# Patient Record
Sex: MALE | Race: BLACK OR AFRICAN AMERICAN | NOT HISPANIC OR LATINO | Employment: OTHER | URBAN - METROPOLITAN AREA
[De-identification: names, ages, dates, MRNs, and addresses within clinical notes are randomized per-mention and may not be internally consistent; named-entity substitution may affect disease eponyms.]

---

## 2023-04-21 LAB
ANION GAP IN SER/PLAS: 10 MMOL/L (ref 10–20)
CALCIUM (MG/DL) IN SER/PLAS: 9.5 MG/DL (ref 8.6–10.3)
CARBON DIOXIDE, TOTAL (MMOL/L) IN SER/PLAS: 27 MMOL/L (ref 21–32)
CHLORIDE (MMOL/L) IN SER/PLAS: 108 MMOL/L (ref 98–107)
CREATININE (MG/DL) IN SER/PLAS: 2.1 MG/DL (ref 0.5–1.3)
GFR MALE: 33 ML/MIN/1.73M2
GLUCOSE (MG/DL) IN SER/PLAS: 224 MG/DL (ref 74–99)
POTASSIUM (MMOL/L) IN SER/PLAS: 4.2 MMOL/L (ref 3.5–5.3)
SODIUM (MMOL/L) IN SER/PLAS: 141 MMOL/L (ref 136–145)
UREA NITROGEN (MG/DL) IN SER/PLAS: 26 MG/DL (ref 6–23)

## 2023-07-21 LAB
ALANINE AMINOTRANSFERASE (SGPT) (U/L) IN SER/PLAS: 14 U/L (ref 10–52)
ALBUMIN (G/DL) IN SER/PLAS: 4 G/DL (ref 3.4–5)
ALBUMIN (MG/L) IN URINE: 19 MG/L
ALBUMIN/CREATININE (UG/MG) IN URINE: 10.9 UG/MG CRT (ref 0–30)
ALKALINE PHOSPHATASE (U/L) IN SER/PLAS: 52 U/L (ref 33–136)
ANION GAP IN SER/PLAS: 12 MMOL/L (ref 10–20)
APPEARANCE, URINE: CLEAR
ASPARTATE AMINOTRANSFERASE (SGOT) (U/L) IN SER/PLAS: 14 U/L (ref 9–39)
BILIRUBIN TOTAL (MG/DL) IN SER/PLAS: 0.6 MG/DL (ref 0–1.2)
BILIRUBIN, URINE: NEGATIVE
BLOOD, URINE: NEGATIVE
CALCIDIOL (25 OH VITAMIN D3) (NG/ML) IN SER/PLAS: 42 NG/ML
CALCIUM (MG/DL) IN SER/PLAS: 9.3 MG/DL (ref 8.6–10.3)
CARBON DIOXIDE, TOTAL (MMOL/L) IN SER/PLAS: 25 MMOL/L (ref 21–32)
CHLORIDE (MMOL/L) IN SER/PLAS: 107 MMOL/L (ref 98–107)
COLOR, URINE: YELLOW
CREATININE (MG/DL) IN SER/PLAS: 2.05 MG/DL (ref 0.5–1.3)
CREATININE (MG/DL) IN URINE: 174 MG/DL (ref 20–370)
CREATININE (MG/DL) IN URINE: 174 MG/DL (ref 20–370)
ERYTHROCYTE DISTRIBUTION WIDTH (RATIO) BY AUTOMATED COUNT: 13.7 % (ref 11.5–14.5)
ERYTHROCYTE MEAN CORPUSCULAR HEMOGLOBIN CONCENTRATION (G/DL) BY AUTOMATED: 32.1 G/DL (ref 32–36)
ERYTHROCYTE MEAN CORPUSCULAR VOLUME (FL) BY AUTOMATED COUNT: 86 FL (ref 80–100)
ERYTHROCYTES (10*6/UL) IN BLOOD BY AUTOMATED COUNT: 4.94 X10E12/L (ref 4.5–5.9)
GFR MALE: 34 ML/MIN/1.73M2
GLUCOSE (MG/DL) IN SER/PLAS: 163 MG/DL (ref 74–99)
GLUCOSE, URINE: NEGATIVE MG/DL
HEMATOCRIT (%) IN BLOOD BY AUTOMATED COUNT: 42.7 % (ref 41–52)
HEMOGLOBIN (G/DL) IN BLOOD: 13.7 G/DL (ref 13.5–17.5)
HYALINE CASTS, URINE: ABNORMAL /LPF
KETONES, URINE: NEGATIVE MG/DL
LEUKOCYTE ESTERASE, URINE: ABNORMAL
LEUKOCYTES (10*3/UL) IN BLOOD BY AUTOMATED COUNT: 5.9 X10E9/L (ref 4.4–11.3)
MAGNESIUM (MG/DL) IN SER/PLAS: 1.68 MG/DL (ref 1.6–2.4)
NITRITE, URINE: NEGATIVE
PARATHYRIN INTACT (PG/ML) IN SER/PLAS: 58.5 PG/ML (ref 18.5–88)
PH, URINE: 5 (ref 5–8)
PHOSPHATE (MG/DL) IN SER/PLAS: 3 MG/DL (ref 2.5–4.9)
PLATELETS (10*3/UL) IN BLOOD AUTOMATED COUNT: 226 X10E9/L (ref 150–450)
POTASSIUM (MMOL/L) IN SER/PLAS: 4.1 MMOL/L (ref 3.5–5.3)
PROTEIN (MG/DL) IN URINE: 34 MG/DL (ref 5–25)
PROTEIN TOTAL: 7.7 G/DL (ref 6.4–8.2)
PROTEIN, URINE: NEGATIVE MG/DL
PROTEIN/CREATININE (MG/MG) IN URINE: 0.2 MG/MG CREAT (ref 0–0.17)
RBC, URINE: ABNORMAL /HPF (ref 0–5)
SODIUM (MMOL/L) IN SER/PLAS: 140 MMOL/L (ref 136–145)
SPECIFIC GRAVITY, URINE: 1.02 (ref 1–1.03)
URATE (MG/DL) IN SER/PLAS: 6.2 MG/DL (ref 4–7.5)
UREA NITROGEN (MG/DL) IN SER/PLAS: 27 MG/DL (ref 6–23)
UROBILINOGEN, URINE: <2 MG/DL (ref 0–1.9)
WBC, URINE: 3 /HPF (ref 0–5)

## 2023-11-14 ENCOUNTER — LAB (OUTPATIENT)
Dept: LAB | Facility: LAB | Age: 68
End: 2023-11-14
Payer: MEDICARE

## 2023-11-14 DIAGNOSIS — N40.1 BENIGN PROSTATIC HYPERPLASIA WITH LOWER URINARY TRACT SYMPTOMS: ICD-10-CM

## 2023-11-14 DIAGNOSIS — N18.4 CHRONIC KIDNEY DISEASE, STAGE 4 (SEVERE) (MULTI): ICD-10-CM

## 2023-11-14 DIAGNOSIS — N13.8 OTHER OBSTRUCTIVE AND REFLUX UROPATHY: ICD-10-CM

## 2023-11-14 DIAGNOSIS — N18.9 CHRONIC KIDNEY DISEASE, UNSPECIFIED: Primary | ICD-10-CM

## 2023-11-14 LAB
ANION GAP SERPL CALC-SCNC: 12 MMOL/L (ref 10–20)
BUN SERPL-MCNC: 24 MG/DL (ref 6–23)
CALCIUM SERPL-MCNC: 9 MG/DL (ref 8.6–10.3)
CHLORIDE SERPL-SCNC: 107 MMOL/L (ref 98–107)
CO2 SERPL-SCNC: 27 MMOL/L (ref 21–32)
CREAT SERPL-MCNC: 1.96 MG/DL (ref 0.5–1.3)
GFR SERPL CREATININE-BSD FRML MDRD: 36 ML/MIN/1.73M*2
GLUCOSE SERPL-MCNC: 122 MG/DL (ref 74–99)
POTASSIUM SERPL-SCNC: 3.7 MMOL/L (ref 3.5–5.3)
PSA SERPL-MCNC: 1.86 NG/ML
SODIUM SERPL-SCNC: 142 MMOL/L (ref 136–145)

## 2023-11-14 PROCEDURE — 36415 COLL VENOUS BLD VENIPUNCTURE: CPT

## 2023-11-14 PROCEDURE — 80048 BASIC METABOLIC PNL TOTAL CA: CPT

## 2023-11-14 PROCEDURE — 84153 ASSAY OF PSA TOTAL: CPT

## 2023-11-16 ENCOUNTER — OFFICE VISIT (OUTPATIENT)
Dept: UROLOGY | Facility: CLINIC | Age: 68
End: 2023-11-16
Payer: MEDICARE

## 2023-11-16 VITALS — RESPIRATION RATE: 16 BRPM | BODY MASS INDEX: 22.74 KG/M2 | WEIGHT: 154 LBS

## 2023-11-16 DIAGNOSIS — R35.1 BENIGN PROSTATIC HYPERPLASIA WITH NOCTURIA: ICD-10-CM

## 2023-11-16 DIAGNOSIS — N52.9 ERECTILE DISORDER: ICD-10-CM

## 2023-11-16 DIAGNOSIS — N40.1 BPH ASSOCIATED WITH NOCTURIA: ICD-10-CM

## 2023-11-16 DIAGNOSIS — N28.9 RENAL DISEASE: Primary | ICD-10-CM

## 2023-11-16 DIAGNOSIS — R35.1 NOCTURIA: ICD-10-CM

## 2023-11-16 DIAGNOSIS — N40.1 BENIGN PROSTATIC HYPERPLASIA WITH NOCTURIA: ICD-10-CM

## 2023-11-16 DIAGNOSIS — R35.1 BPH ASSOCIATED WITH NOCTURIA: ICD-10-CM

## 2023-11-16 PROBLEM — N40.0 BPH (BENIGN PROSTATIC HYPERPLASIA): Status: ACTIVE | Noted: 2023-11-16

## 2023-11-16 PROCEDURE — 99214 OFFICE O/P EST MOD 30 MIN: CPT | Performed by: UROLOGY

## 2023-11-16 PROCEDURE — 1159F MED LIST DOCD IN RCRD: CPT | Performed by: UROLOGY

## 2023-11-16 PROCEDURE — 1036F TOBACCO NON-USER: CPT | Performed by: UROLOGY

## 2023-11-16 PROCEDURE — 1160F RVW MEDS BY RX/DR IN RCRD: CPT | Performed by: UROLOGY

## 2023-11-16 RX ORDER — SILDENAFIL 100 MG/1
100 TABLET, FILM COATED ORAL AS NEEDED
Qty: 12 TABLET | Refills: 3 | Status: SHIPPED | OUTPATIENT
Start: 2023-11-16 | End: 2023-11-20 | Stop reason: WASHOUT

## 2023-11-16 RX ORDER — INSULIN ASPART 100 [IU]/ML
INJECTION, SOLUTION INTRAVENOUS; SUBCUTANEOUS
COMMUNITY

## 2023-11-16 RX ORDER — ATORVASTATIN CALCIUM 80 MG/1
80 TABLET, FILM COATED ORAL
COMMUNITY
Start: 2022-10-31

## 2023-11-16 RX ORDER — INSULIN GLARGINE 100 [IU]/ML
INJECTION, SOLUTION SUBCUTANEOUS
COMMUNITY
End: 2023-11-20 | Stop reason: SDUPTHER

## 2023-11-16 RX ORDER — METOPROLOL TARTRATE 100 MG/1
1 TABLET ORAL DAILY
COMMUNITY
End: 2023-11-20 | Stop reason: SDUPTHER

## 2023-11-16 RX ORDER — ASPIRIN 81 MG/1
1 TABLET ORAL DAILY
COMMUNITY

## 2023-11-16 RX ORDER — METOPROLOL SUCCINATE 100 MG/1
100 TABLET, EXTENDED RELEASE ORAL DAILY
COMMUNITY

## 2023-11-16 ASSESSMENT — ENCOUNTER SYMPTOMS
ENDOCRINE NEGATIVE: 1
DIFFICULTY URINATING: 0
PSYCHIATRIC NEGATIVE: 1
COUGH: 0
CHILLS: 0
FEVER: 0
ALLERGIC/IMMUNOLOGIC NEGATIVE: 1
NAUSEA: 0
EYES NEGATIVE: 1
SHORTNESS OF BREATH: 0

## 2023-11-16 NOTE — PROGRESS NOTES
Subjective   Patient ID: Frank Aquino is a 68 y.o. male.    HPI  Patient has a Hx of CKD and incomplete bladder emptying.. Most recent CR was 1.96 (11/23) Previous PSA was 2.32 AND BUN was 24 (11/23) Previous PSA was 23 on 10/22. Prior CR was 2.62 on 10/21, Patient has had vu in the past, but this did not change CR .. He had HOLEP procedure at  4 years ago... Patient does see Dr. Goss.. ..Most recent PSA was 1.86 (11/23) Previous PSA was 1.82 on 10/22. Prior PSA was 2.51 on 10/21, prior was 1.5 9/20.. Prior PSA was 1.77 on 3/2020 ..CHronic BPH sx are mild and stable. denies urgency and frequency. Denies dysuria. denies weak stream and hesitancy Denies hematuria. Nocturia 2x...No medication for LUT'S...ED is chronic. Pt. is taking Isosorbide and Nitro. Patient has rx for trimix.        Review of Systems   Constitutional:  Negative for chills and fever.   HENT: Negative.     Eyes: Negative.    Respiratory:  Negative for cough and shortness of breath.    Cardiovascular:  Negative for chest pain and leg swelling.   Gastrointestinal:  Negative for nausea.   Endocrine: Negative.    Genitourinary:  Negative for difficulty urinating.        Negative except for documented in HPI   Allergic/Immunologic: Negative.    Neurological:         Alert & oriented X 3   Hematological:         Denies blood thinners   Psychiatric/Behavioral: Negative.         Objective   Physical Exam  Vitals and nursing note reviewed.   Constitutional:       General: He is not in acute distress.     Appearance: Normal appearance.   Pulmonary:      Effort: Pulmonary effort is normal.   Abdominal:      Tenderness: There is no abdominal tenderness.   Genitourinary:     Comments: Kidneys non palpable bilaterally  Bladder non palpable or tender  Scrotum no mass, No hydrocele  Epididymis- No spermatocele. Non Tender.  Testicles: No mass. WNL  Urethra: No discharge  Penis within normal limits... No lesions. Circumcised  Prostate - symmetric, no  nodules. BENIGN  Seminal Vesicals: No mass.  Sphincter tone: normal  Neurological:      Mental Status: He is alert.         Assessment/Plan   Diagnoses and all orders for this visit:  Renal disease  BPH associated with nocturia  Nocturia  Benign prostatic hyperplasia with nocturia  Erectile disorder      All available PSA values reviewed, Options discussed. Questions answered.   Diet changes for prostate health discussed and educational information given. Pros/Cons of prostate health supplements discussed.   Treatment options for LUTS reviewed  Pleased since HOLEP procedure  Discussed timed voiding. Discussed fluid and caffeine intake  Treatment options for ED reviewed.  Sildenafil Rx given  Lifestyle change to help prevent UTIs discussed. Encouraged fluid intake.  Reviewed notes from dr Almanzar  BMP reviewed    F/U with Cysto

## 2023-11-20 ENCOUNTER — OFFICE VISIT (OUTPATIENT)
Dept: NEPHROLOGY | Facility: CLINIC | Age: 68
End: 2023-11-20
Payer: MEDICARE

## 2023-11-20 VITALS
SYSTOLIC BLOOD PRESSURE: 116 MMHG | HEIGHT: 69 IN | WEIGHT: 159 LBS | DIASTOLIC BLOOD PRESSURE: 68 MMHG | HEART RATE: 71 BPM | BODY MASS INDEX: 23.55 KG/M2

## 2023-11-20 DIAGNOSIS — I10 PRIMARY HYPERTENSION: ICD-10-CM

## 2023-11-20 DIAGNOSIS — N18.32 TYPE 2 DIABETES MELLITUS WITH STAGE 3B CHRONIC KIDNEY DISEASE, WITHOUT LONG-TERM CURRENT USE OF INSULIN (MULTI): ICD-10-CM

## 2023-11-20 DIAGNOSIS — N18.32 STAGE 3B CHRONIC KIDNEY DISEASE (MULTI): Primary | ICD-10-CM

## 2023-11-20 DIAGNOSIS — E11.22 TYPE 2 DIABETES MELLITUS WITH STAGE 3B CHRONIC KIDNEY DISEASE, WITHOUT LONG-TERM CURRENT USE OF INSULIN (MULTI): ICD-10-CM

## 2023-11-20 PROBLEM — N18.30 CHRONIC KIDNEY DISEASE (CKD), STAGE III (MODERATE) (MULTI): Status: ACTIVE | Noted: 2023-11-20

## 2023-11-20 PROCEDURE — 1160F RVW MEDS BY RX/DR IN RCRD: CPT | Performed by: CLINICAL NURSE SPECIALIST

## 2023-11-20 PROCEDURE — 3066F NEPHROPATHY DOC TX: CPT | Performed by: CLINICAL NURSE SPECIALIST

## 2023-11-20 PROCEDURE — 1159F MED LIST DOCD IN RCRD: CPT | Performed by: CLINICAL NURSE SPECIALIST

## 2023-11-20 PROCEDURE — 3074F SYST BP LT 130 MM HG: CPT | Performed by: CLINICAL NURSE SPECIALIST

## 2023-11-20 PROCEDURE — 99213 OFFICE O/P EST LOW 20 MIN: CPT | Performed by: CLINICAL NURSE SPECIALIST

## 2023-11-20 PROCEDURE — 3078F DIAST BP <80 MM HG: CPT | Performed by: CLINICAL NURSE SPECIALIST

## 2023-11-20 PROCEDURE — 1036F TOBACCO NON-USER: CPT | Performed by: CLINICAL NURSE SPECIALIST

## 2023-11-20 RX ORDER — INSULIN GLARGINE 300 U/ML
INJECTION, SOLUTION SUBCUTANEOUS NIGHTLY
COMMUNITY

## 2023-11-20 RX ORDER — ISOSORBIDE MONONITRATE 60 MG/1
60 TABLET, EXTENDED RELEASE ORAL DAILY
COMMUNITY

## 2023-11-20 ASSESSMENT — ENCOUNTER SYMPTOMS
ENDOCRINE NEGATIVE: 1
NEUROLOGICAL NEGATIVE: 1
CARDIOVASCULAR NEGATIVE: 1
GASTROINTESTINAL NEGATIVE: 1
RESPIRATORY NEGATIVE: 1
PSYCHIATRIC NEGATIVE: 1
CONSTITUTIONAL NEGATIVE: 1
MUSCULOSKELETAL NEGATIVE: 1

## 2023-11-20 NOTE — PROGRESS NOTES
Subjective   Patient ID: Frank Aquino is a 68 y.o. male who presents for Follow-up (4 month ck/Labs 11/14) and Chronic Kidney Disease.  Patient being seen in follow-up for chronic kidney disease stage IIIB    Labs reviewed  Lab Results       Component                Value               Date                       GLUCOSE                  122 (H)             11/14/2023                 CALCIUM                  9.0                 11/14/2023                 NA                       142                 11/14/2023                 K                        3.7                 11/14/2023                 CO2                      27                  11/14/2023                 CL                       107                 11/14/2023                 BUN                      24 (H)              11/14/2023                 CREATININE               1.96 (H)            11/14/2023        Doing well  Has no complaints at this time  No swelling  Is voiding without difficulties  Is having better control of his blood sugars  Blood pressure is well controlled                  Review of Systems   Constitutional: Negative.    Respiratory: Negative.     Cardiovascular: Negative.    Gastrointestinal: Negative.    Endocrine: Negative.    Genitourinary: Negative.    Musculoskeletal: Negative.    Skin: Negative.    Neurological: Negative.    Psychiatric/Behavioral: Negative.         Objective   Physical Exam  Vitals reviewed.   Constitutional:       Appearance: Normal appearance.   HENT:      Head: Normocephalic.   Cardiovascular:      Rate and Rhythm: Normal rate and regular rhythm.   Pulmonary:      Effort: Pulmonary effort is normal.      Breath sounds: Normal breath sounds.   Abdominal:      Palpations: Abdomen is soft.   Musculoskeletal:         General: Normal range of motion.   Skin:     General: Skin is warm and dry.   Neurological:      Mental Status: He is alert and oriented to person, place, and time.   Psychiatric:         Mood and  Affect: Mood normal.         Behavior: Behavior normal.         Assessment/Plan   Problem List Items Addressed This Visit             ICD-10-CM       Cardiac and Vasculature    Hypertension I10     Blood pressure is currently well controlled on metoprolol, isosorbide, he apparently had used sildenafil in the past however has not used it, I did tell him that I was going to discontinue this as he should not be taking sildenafil and isosorbide together            Endocrine/Metabolic    Type 2 diabetes mellitus with stage 3b chronic kidney disease (CMS/HCC) E11.22, N18.32     Getting better control of his diabetes, last hemoglobin A1c was 8.0, prior to that he had been 10, will start Jardiance 10 mg, we did try this in the past however he was unable to afford it, we will try to get him some prescription assistance         Relevant Medications    empagliflozin (Jardiance) 10 mg    Other Relevant Orders    Basic metabolic panel    Urinalysis with Reflex Microscopic    Albumin, urine, random    Follow Up In Nephrology       Genitourinary and Reproductive    Chronic kidney disease (CKD), stage III (moderate) (CMS/[facility]) - Primary N18.30     Creatinine stable at 1.96 and is on the lower end of his normal limits, starting Jardiance 10 mg which we have tried to start in the past, no other changes at this time         Relevant Medications    empagliflozin (Jardiance) 10 mg    Other Relevant Orders    Basic metabolic panel    Urinalysis with Reflex Microscopic    Albumin, urine, random    Follow Up In Nephrology        CKD stage IV with baseline creatinine of 2-2.5  Diabetes mellitus type 2  BPH with obstruction status post TURP  Elevated PSA/biopsy negative  Vitamin D deficiency  Hypertension/well controlled

## 2023-11-20 NOTE — ASSESSMENT & PLAN NOTE
Blood pressure is currently well controlled on metoprolol, isosorbide, he apparently had used sildenafil in the past however has not used it, I did tell him that I was going to discontinue this as he should not be taking sildenafil and isosorbide together

## 2023-11-20 NOTE — ASSESSMENT & PLAN NOTE
Creatinine stable at 1.96 and is on the lower end of his normal limits, starting Jardiance 10 mg which we have tried to start in the past, no other changes at this time

## 2023-11-20 NOTE — ASSESSMENT & PLAN NOTE
Getting better control of his diabetes, last hemoglobin A1c was 8.0, prior to that he had been 10, will start Jardiance 10 mg, we did try this in the past however he was unable to afford it, we will try to get him some prescription assistance

## 2024-01-16 PROBLEM — R33.9 URINARY RETENTION: Status: ACTIVE | Noted: 2024-01-16

## 2024-01-16 NOTE — PROGRESS NOTES
Patient ID: Frank Aquino is a 68 y.o. male.    Procedures    The patient was prepped using a Betadine solution. Lidocaine jelly was instilled into the urethra. The flexible cystoscope was sterilely inserted into the urethra and formal cystoscopy performed in a systematic fashion. . For detailed findings of the procedure, please see Dr. Jimenes's remarks below     PSA was 1.86 (11/23) Previous PSA was 1.82 on 10/22.     Hx of HOLEP procedure 2019    BUN 24,  Cr 1.96 (11/23)

## 2024-01-22 ENCOUNTER — APPOINTMENT (OUTPATIENT)
Dept: UROLOGY | Facility: CLINIC | Age: 69
End: 2024-01-22
Payer: MEDICARE

## 2024-01-25 ENCOUNTER — PROCEDURE VISIT (OUTPATIENT)
Dept: UROLOGY | Facility: CLINIC | Age: 69
End: 2024-01-25
Payer: MEDICARE

## 2024-01-25 VITALS — WEIGHT: 154 LBS | BODY MASS INDEX: 22.81 KG/M2 | HEIGHT: 69 IN

## 2024-01-25 DIAGNOSIS — R33.9 INCOMPLETE BLADDER EMPTYING: Primary | ICD-10-CM

## 2024-01-25 PROCEDURE — 52000 CYSTOURETHROSCOPY: CPT | Performed by: UROLOGY

## 2024-01-25 RX ORDER — CIPROFLOXACIN 250 MG/1
250 TABLET, FILM COATED ORAL 2 TIMES DAILY
Qty: 6 TABLET | Refills: 0 | Status: SHIPPED | OUTPATIENT
Start: 2024-01-25 | End: 2024-01-28

## 2024-01-25 NOTE — PROGRESS NOTES
Patient ID: Frank Aquino is a 68 y.o. male.    Procedures  The patient was prepped using a Betadine solution. Lidocaine jelly was instilled into the urethra. The flexible cystoscope was sterilely inserted into the urethra and formal cystoscopy performed in a systematic fashion. . For detailed findings of the procedure, please see Dr. Jimenes remarks below  CIPRO 250MG POBID TIMES 3 DAYS GIVEN      PSA 1.86 (11/2023)  1.82 (10/24/2022)   2.51 (10/23/2021)      NO MASS. NO STONE. MINIMAL TRABECULATION    PVR<50ML.    F/U 11/24 WITH PSA

## 2024-03-12 ENCOUNTER — LAB (OUTPATIENT)
Dept: LAB | Facility: LAB | Age: 69
End: 2024-03-12
Payer: MEDICARE

## 2024-03-12 DIAGNOSIS — N18.32 STAGE 3B CHRONIC KIDNEY DISEASE (MULTI): ICD-10-CM

## 2024-03-12 DIAGNOSIS — N18.32 TYPE 2 DIABETES MELLITUS WITH STAGE 3B CHRONIC KIDNEY DISEASE, WITHOUT LONG-TERM CURRENT USE OF INSULIN (MULTI): ICD-10-CM

## 2024-03-12 DIAGNOSIS — E11.22 TYPE 2 DIABETES MELLITUS WITH STAGE 3B CHRONIC KIDNEY DISEASE, WITHOUT LONG-TERM CURRENT USE OF INSULIN (MULTI): ICD-10-CM

## 2024-03-12 LAB
ANION GAP SERPL CALC-SCNC: 12 MMOL/L (ref 10–20)
APPEARANCE UR: CLEAR
BILIRUB UR STRIP.AUTO-MCNC: NEGATIVE MG/DL
BUN SERPL-MCNC: 27 MG/DL (ref 6–23)
CALCIUM SERPL-MCNC: 9 MG/DL (ref 8.6–10.3)
CHLORIDE SERPL-SCNC: 106 MMOL/L (ref 98–107)
CO2 SERPL-SCNC: 24 MMOL/L (ref 21–32)
COLOR UR: YELLOW
CREAT SERPL-MCNC: 2.09 MG/DL (ref 0.5–1.3)
EGFRCR SERPLBLD CKD-EPI 2021: 33 ML/MIN/1.73M*2
GLUCOSE SERPL-MCNC: 190 MG/DL (ref 74–99)
GLUCOSE UR STRIP.AUTO-MCNC: NEGATIVE MG/DL
KETONES UR STRIP.AUTO-MCNC: NEGATIVE MG/DL
LEUKOCYTE ESTERASE UR QL STRIP.AUTO: ABNORMAL
NITRITE UR QL STRIP.AUTO: NEGATIVE
PH UR STRIP.AUTO: 5 [PH]
POTASSIUM SERPL-SCNC: 4 MMOL/L (ref 3.5–5.3)
PROT UR STRIP.AUTO-MCNC: NEGATIVE MG/DL
RBC # UR STRIP.AUTO: NEGATIVE /UL
RBC #/AREA URNS AUTO: NORMAL /HPF
SODIUM SERPL-SCNC: 138 MMOL/L (ref 136–145)
SP GR UR STRIP.AUTO: 1.01
UROBILINOGEN UR STRIP.AUTO-MCNC: <2 MG/DL
WBC #/AREA URNS AUTO: NORMAL /HPF

## 2024-03-12 PROCEDURE — 82570 ASSAY OF URINE CREATININE: CPT

## 2024-03-12 PROCEDURE — 81001 URINALYSIS AUTO W/SCOPE: CPT

## 2024-03-12 PROCEDURE — 82043 UR ALBUMIN QUANTITATIVE: CPT

## 2024-03-12 PROCEDURE — 80048 BASIC METABOLIC PNL TOTAL CA: CPT

## 2024-03-12 PROCEDURE — 36415 COLL VENOUS BLD VENIPUNCTURE: CPT

## 2024-03-13 LAB
CREAT UR-MCNC: 112.7 MG/DL (ref 20–370)
MICROALBUMIN UR-MCNC: 38.6 MG/L
MICROALBUMIN/CREAT UR: 34.3 UG/MG CREAT

## 2024-03-15 ENCOUNTER — OFFICE VISIT (OUTPATIENT)
Dept: NEPHROLOGY | Facility: CLINIC | Age: 69
End: 2024-03-15
Payer: MEDICARE

## 2024-03-15 VITALS
WEIGHT: 165 LBS | DIASTOLIC BLOOD PRESSURE: 70 MMHG | SYSTOLIC BLOOD PRESSURE: 118 MMHG | HEART RATE: 72 BPM | BODY MASS INDEX: 24.44 KG/M2 | HEIGHT: 69 IN

## 2024-03-15 DIAGNOSIS — I10 PRIMARY HYPERTENSION: ICD-10-CM

## 2024-03-15 DIAGNOSIS — N18.32 STAGE 3B CHRONIC KIDNEY DISEASE (MULTI): Primary | ICD-10-CM

## 2024-03-15 DIAGNOSIS — N18.32 TYPE 2 DIABETES MELLITUS WITH STAGE 3B CHRONIC KIDNEY DISEASE, WITHOUT LONG-TERM CURRENT USE OF INSULIN (MULTI): ICD-10-CM

## 2024-03-15 DIAGNOSIS — E11.22 TYPE 2 DIABETES MELLITUS WITH STAGE 3B CHRONIC KIDNEY DISEASE, WITHOUT LONG-TERM CURRENT USE OF INSULIN (MULTI): ICD-10-CM

## 2024-03-15 PROCEDURE — 1160F RVW MEDS BY RX/DR IN RCRD: CPT | Performed by: CLINICAL NURSE SPECIALIST

## 2024-03-15 PROCEDURE — 3074F SYST BP LT 130 MM HG: CPT | Performed by: CLINICAL NURSE SPECIALIST

## 2024-03-15 PROCEDURE — 3060F POS MICROALBUMINURIA REV: CPT | Performed by: CLINICAL NURSE SPECIALIST

## 2024-03-15 PROCEDURE — 1159F MED LIST DOCD IN RCRD: CPT | Performed by: CLINICAL NURSE SPECIALIST

## 2024-03-15 PROCEDURE — 99213 OFFICE O/P EST LOW 20 MIN: CPT | Performed by: CLINICAL NURSE SPECIALIST

## 2024-03-15 PROCEDURE — 3078F DIAST BP <80 MM HG: CPT | Performed by: CLINICAL NURSE SPECIALIST

## 2024-03-15 PROCEDURE — 1036F TOBACCO NON-USER: CPT | Performed by: CLINICAL NURSE SPECIALIST

## 2024-03-15 ASSESSMENT — ENCOUNTER SYMPTOMS
CARDIOVASCULAR NEGATIVE: 1
CONSTITUTIONAL NEGATIVE: 1
NEUROLOGICAL NEGATIVE: 1
RESPIRATORY NEGATIVE: 1
GASTROINTESTINAL NEGATIVE: 1
PSYCHIATRIC NEGATIVE: 1
ENDOCRINE NEGATIVE: 1
MUSCULOSKELETAL NEGATIVE: 1

## 2024-03-15 NOTE — ASSESSMENT & PLAN NOTE
Renal function is stable with creatinine of 2.09, GFR is 33, blood pressure is well-controlled, continues to have blood sugars that are not always well-controlled secondary to his dietary habits

## 2024-03-15 NOTE — ASSESSMENT & PLAN NOTE
Blood pressure is currently well-controlled on metoprolol and isosorbide, he has not been having any chest pain

## 2024-03-15 NOTE — ASSESSMENT & PLAN NOTE
Blood sugars have been running high, was given Jardiance in the past however he did not take this as he stated one of his physicians told him not to, he is unsure who this is, we will attempt this again for both cardiac and renal protection, will attempt to try this again, we will attempt to get this for him through the company as he states he does not have prescription insurance anymore

## 2024-03-15 NOTE — PROGRESS NOTES
Subjective   Patient ID: Frank Aquino is a 68 y.o. male who presents for Follow-up (4 month ck/Review labs 3/12).  Patient being seen in follow-up for chronic kidney disease stage IIIb    Labs reviewed  Albumin creatinine ratio 34.3  Urinalysis essentially normal  Glucose 190  Sodium 138, potassium 4.0, bicarb 24, chloride 106  Renal function with BUN of 27 and creatinine of 2.09, GFR 33   Calcium 9    He is doing fairly well  He has no complaints  He has no edema  He is voiding without difficulties  His blood pressure is well-controlled  He has been having high blood sugars but states that he has not been eating right        Review of Systems   Constitutional: Negative.    Respiratory: Negative.     Cardiovascular: Negative.    Gastrointestinal: Negative.    Endocrine: Negative.    Genitourinary: Negative.    Musculoskeletal: Negative.    Skin: Negative.    Neurological: Negative.    Psychiatric/Behavioral: Negative.         Objective   Physical Exam  Vitals reviewed.   Constitutional:       Appearance: Normal appearance.   HENT:      Head: Normocephalic.   Cardiovascular:      Rate and Rhythm: Normal rate and regular rhythm.   Pulmonary:      Effort: Pulmonary effort is normal.      Breath sounds: Normal breath sounds.   Abdominal:      Palpations: Abdomen is soft.   Musculoskeletal:         General: Normal range of motion.   Skin:     General: Skin is warm and dry.   Neurological:      Mental Status: He is alert and oriented to person, place, and time.   Psychiatric:         Mood and Affect: Mood normal.         Behavior: Behavior normal.         Assessment/Plan   Problem List Items Addressed This Visit             ICD-10-CM    Chronic kidney disease (CKD), stage III (moderate) (CMS/[facility]) - Primary N18.30     Renal function is stable with creatinine of 2.09, GFR is 33, blood pressure is well-controlled, continues to have blood sugars that are not always well-controlled secondary to his dietary habits          Relevant Medications    empagliflozin (Jardiance) 10 mg    Other Relevant Orders    Basic metabolic panel    Follow Up In Nephrology    Hypertension I10     Blood pressure is currently well-controlled on metoprolol and isosorbide, he has not been having any chest pain         Type 2 diabetes mellitus with stage 3b chronic kidney disease (CMS/HCC) E11.22, N18.32     Blood sugars have been running high, was given Jardiance in the past however he did not take this as he stated one of his physicians told him not to, he is unsure who this is, we will attempt this again for both cardiac and renal protection, will attempt to try this again, we will attempt to get this for him through the company as he states he does not have prescription insurance anymore         Relevant Medications    empagliflozin (Jardiance) 10 mg      CKD stage IV with baseline creatinine of 2-2.5  Diabetes mellitus type 2  BPH with obstruction status post TURP  Elevated PSA/biopsy negative  Vitamin D deficiency  Hypertension/well controlled        DEISI Goyal-JHONATAN, DNP 03/15/24 10:40 AM

## 2024-06-07 ENCOUNTER — LAB (OUTPATIENT)
Dept: LAB | Facility: LAB | Age: 69
End: 2024-06-07
Payer: MEDICARE

## 2024-06-07 DIAGNOSIS — N18.32 STAGE 3B CHRONIC KIDNEY DISEASE (MULTI): ICD-10-CM

## 2024-06-07 LAB
ANION GAP SERPL CALC-SCNC: 11 MMOL/L (ref 10–20)
BUN SERPL-MCNC: 21 MG/DL (ref 6–23)
CALCIUM SERPL-MCNC: 8.7 MG/DL (ref 8.6–10.3)
CHLORIDE SERPL-SCNC: 106 MMOL/L (ref 98–107)
CO2 SERPL-SCNC: 28 MMOL/L (ref 21–32)
CREAT SERPL-MCNC: 2 MG/DL (ref 0.5–1.3)
EGFRCR SERPLBLD CKD-EPI 2021: 35 ML/MIN/1.73M*2
GLUCOSE SERPL-MCNC: 154 MG/DL (ref 74–99)
POTASSIUM SERPL-SCNC: 3.8 MMOL/L (ref 3.5–5.3)
SODIUM SERPL-SCNC: 141 MMOL/L (ref 136–145)

## 2024-06-07 PROCEDURE — 36415 COLL VENOUS BLD VENIPUNCTURE: CPT

## 2024-06-07 PROCEDURE — 80048 BASIC METABOLIC PNL TOTAL CA: CPT

## 2024-06-11 ENCOUNTER — OFFICE VISIT (OUTPATIENT)
Dept: NEPHROLOGY | Facility: CLINIC | Age: 69
End: 2024-06-11
Payer: MEDICARE

## 2024-06-11 VITALS
DIASTOLIC BLOOD PRESSURE: 68 MMHG | SYSTOLIC BLOOD PRESSURE: 112 MMHG | HEIGHT: 69 IN | WEIGHT: 167 LBS | BODY MASS INDEX: 24.73 KG/M2 | HEART RATE: 66 BPM

## 2024-06-11 DIAGNOSIS — N18.32 STAGE 3B CHRONIC KIDNEY DISEASE (MULTI): Primary | ICD-10-CM

## 2024-06-11 DIAGNOSIS — E11.22 TYPE 2 DIABETES MELLITUS WITH STAGE 3B CHRONIC KIDNEY DISEASE, WITHOUT LONG-TERM CURRENT USE OF INSULIN (MULTI): ICD-10-CM

## 2024-06-11 DIAGNOSIS — I10 PRIMARY HYPERTENSION: ICD-10-CM

## 2024-06-11 DIAGNOSIS — N18.32 TYPE 2 DIABETES MELLITUS WITH STAGE 3B CHRONIC KIDNEY DISEASE, WITHOUT LONG-TERM CURRENT USE OF INSULIN (MULTI): ICD-10-CM

## 2024-06-11 DIAGNOSIS — R35.1 BPH ASSOCIATED WITH NOCTURIA: ICD-10-CM

## 2024-06-11 DIAGNOSIS — N40.1 BPH ASSOCIATED WITH NOCTURIA: ICD-10-CM

## 2024-06-11 PROCEDURE — 99213 OFFICE O/P EST LOW 20 MIN: CPT | Performed by: CLINICAL NURSE SPECIALIST

## 2024-06-11 PROCEDURE — 3078F DIAST BP <80 MM HG: CPT | Performed by: CLINICAL NURSE SPECIALIST

## 2024-06-11 PROCEDURE — 1160F RVW MEDS BY RX/DR IN RCRD: CPT | Performed by: CLINICAL NURSE SPECIALIST

## 2024-06-11 PROCEDURE — 3074F SYST BP LT 130 MM HG: CPT | Performed by: CLINICAL NURSE SPECIALIST

## 2024-06-11 PROCEDURE — 1159F MED LIST DOCD IN RCRD: CPT | Performed by: CLINICAL NURSE SPECIALIST

## 2024-06-11 PROCEDURE — 3060F POS MICROALBUMINURIA REV: CPT | Performed by: CLINICAL NURSE SPECIALIST

## 2024-06-11 PROCEDURE — 1036F TOBACCO NON-USER: CPT | Performed by: CLINICAL NURSE SPECIALIST

## 2024-06-11 ASSESSMENT — ENCOUNTER SYMPTOMS
GASTROINTESTINAL NEGATIVE: 1
MUSCULOSKELETAL NEGATIVE: 1
CONSTITUTIONAL NEGATIVE: 1
NEUROLOGICAL NEGATIVE: 1
PSYCHIATRIC NEGATIVE: 1
ENDOCRINE NEGATIVE: 1
RESPIRATORY NEGATIVE: 1
CARDIOVASCULAR NEGATIVE: 1

## 2024-06-11 NOTE — PROGRESS NOTES
Subjective   Patient ID: Frank Aquino is a 68 y.o. male who presents for Follow-up (3 month ck/Review labs 6/7).  Patient being seen in follow-up for chronic kidney disease with history of diabetes, hypertension, coronary artery disease    Labs reviewed  Glucose 154  Sodium 141, potassium 3.8, chloride 106, bicarb 28  Renal functions BUN of 21 and creatinine of 2.00, GFR is 35,  Calcium 8.7    He is doing well  He has no complaints  He just returned from a trip to [place]  He is voiding without difficulties  He continues to have uncontrolled blood sugars sometimes up to 250  He does not take metformin secondary to its side effects  He does not take Jardiance as he cannot afford it (we have offered to help him get this however he has not brought in the information we need to order it for him)            Review of Systems   Constitutional: Negative.    Respiratory: Negative.     Cardiovascular: Negative.    Gastrointestinal: Negative.    Endocrine: Negative.    Genitourinary: Negative.    Musculoskeletal: Negative.    Skin: Negative.    Neurological: Negative.    Psychiatric/Behavioral: Negative.         Objective   Physical Exam  Vitals reviewed.   Constitutional:       Appearance: Normal appearance.   HENT:      Head: Normocephalic.   Cardiovascular:      Rate and Rhythm: Normal rate and regular rhythm.   Pulmonary:      Effort: Pulmonary effort is normal.      Breath sounds: Normal breath sounds.   Abdominal:      Palpations: Abdomen is soft.   Musculoskeletal:         General: Normal range of motion.   Skin:     General: Skin is warm and dry.   Neurological:      Mental Status: He is alert and oriented to person, place, and time.   Psychiatric:         Mood and Affect: Mood normal.         Behavior: Behavior normal.         Assessment/Plan   Problem List Items Addressed This Visit             ICD-10-CM    BPH associated with nocturia N40.1, R35.1    Chronic kidney disease (CKD), stage III (moderate) (Multi) -  Primary N18.30     Creatinine is stable at 2.0 with GFR of 35, blood sugars continue to be uncontrolled, not taking Jardiance at this time, blood pressure is well-controlled         Relevant Orders    Basic metabolic panel    Urinalysis with Reflex Microscopic    Albumin , Urine Random    Follow Up In Nephrology    Hypertension I10     Pressure is currently well-controlled on isosorbide and metoprolol         Type 2 diabetes mellitus with stage 3b chronic kidney disease (Multi) E11.22, N18.32     I have asked him to try to bring in his tax information so that we can try to get some Jardiance for him, at this time he does not have any supplementation to help with medications.       Ruth Almanzar, DEISI-JHONATAN, DNP 06/11/24 10:41 AM

## 2024-06-11 NOTE — ASSESSMENT & PLAN NOTE
Creatinine is stable at 2.0 with GFR of 35, blood sugars continue to be uncontrolled, not taking Jardiance at this time, blood pressure is well-controlled

## 2024-11-12 ENCOUNTER — LAB (OUTPATIENT)
Dept: LAB | Facility: LAB | Age: 69
End: 2024-11-12
Payer: MEDICARE

## 2024-11-13 ASSESSMENT — ENCOUNTER SYMPTOMS
ENDOCRINE NEGATIVE: 1
ALLERGIC/IMMUNOLOGIC NEGATIVE: 1
DIFFICULTY URINATING: 0
NAUSEA: 0
CHILLS: 0
COUGH: 0
PSYCHIATRIC NEGATIVE: 1
SHORTNESS OF BREATH: 0
FEVER: 0
EYES NEGATIVE: 1

## 2024-11-13 NOTE — PROGRESS NOTES
Subjective   Patient ID: Frank Aquino is a 69 y.o. male.    HPI  Patient has a Hx of CKD and incomplete bladder emptying.. Most recent CR was  2.37 on 9/24. Prior CR was 1.96 (11/23)   Prior CR was 2.62 on 10/21, Patient has had vu in the past, but this did not change CR .. He had HOLEP procedure at  4 years ago... Patient does see Dr. Almanzar.. ..No recent PSA was done. Prior PSA was 1.86 (11/23) Previous PSA was 1.82 on 10/22. Prior PSA was 2.51 on 10/21, prior was 1.5 9/20.. Prior PSA was 1.77 on 3/2020 ..CHronic BPH sx are mild and stable. denies urgency and frequency. Denies dysuria. denies weak stream and hesitancy Denies hematuria. Nocturia 2x...No medication for LUT'S...ED is chronic. Pt. is taking Isosorbide and Nitro. Patient has rx for trimix.       Review of Systems   Constitutional:  Negative for chills and fever.   HENT: Negative.     Eyes: Negative.    Respiratory:  Negative for cough and shortness of breath.    Cardiovascular:  Negative for chest pain and leg swelling.   Gastrointestinal:  Negative for nausea.   Endocrine: Negative.    Genitourinary:  Negative for difficulty urinating.        Negative except for documented in HPI   Allergic/Immunologic: Negative.    Neurological:         Alert & oriented X 3   Hematological:         Denies blood thinners   Psychiatric/Behavioral: Negative.         Objective   Physical Exam  Vitals and nursing note reviewed.   Constitutional:       General: He is not in acute distress.     Appearance: Normal appearance.   Pulmonary:      Effort: Pulmonary effort is normal.   Abdominal:      Tenderness: There is no abdominal tenderness.   Genitourinary:     Comments: Kidneys non palpable bilaterally  Bladder non palpable or tender  Scrotum no mass, No hydrocele  Epididymis- No spermatocele. Non Tender.  Testicles: No mass. WNL  Urethra: No discharge  Penis within normal limits... No lesions. circumcised  Prostate - symmetric, no nodules. BENIGN  Seminal  Vesicals: No mass.  Sphincter tone: normal  Neurological:      Mental Status: He is alert.         Assessment/Plan       Diagnoses and all orders for this visit:  Erectile disorder  Benign prostatic hyperplasia with nocturia  Nocturia  Incomplete bladder emptying  Renal disease      All available PSA values reviewed, Options discussed. Questions answered.  New PSA ordered   Diet changes for prostate health discussed and educational information given. Pros/Cons of prostate health supplements discussed.   Treatment options for LUTS reviewed-LUTS much improved on HOLEP  Discussed timed voiding. Discussed fluid and caffeine intake  Treatment options for ED reviewed.  TriMix refills authorized  Lifestyle change to help prevent UTIs discussed. Encouraged fluid intake.  Past BMP reviewed-Reviewed notes from Dr Almanzar  Renal and Bladder U/S ordered  F/U after PSA

## 2024-11-14 ENCOUNTER — APPOINTMENT (OUTPATIENT)
Dept: UROLOGY | Facility: CLINIC | Age: 69
End: 2024-11-14
Payer: MEDICARE

## 2024-11-14 VITALS — DIASTOLIC BLOOD PRESSURE: 84 MMHG | SYSTOLIC BLOOD PRESSURE: 156 MMHG | HEART RATE: 71 BPM

## 2024-11-14 DIAGNOSIS — N40.1 BENIGN PROSTATIC HYPERPLASIA WITH NOCTURIA: ICD-10-CM

## 2024-11-14 DIAGNOSIS — N28.9 RENAL DISEASE: ICD-10-CM

## 2024-11-14 DIAGNOSIS — R35.1 NOCTURIA: ICD-10-CM

## 2024-11-14 DIAGNOSIS — N52.9 ERECTILE DISORDER: ICD-10-CM

## 2024-11-14 DIAGNOSIS — R35.1 BENIGN PROSTATIC HYPERPLASIA WITH NOCTURIA: ICD-10-CM

## 2024-11-14 DIAGNOSIS — R33.9 INCOMPLETE BLADDER EMPTYING: ICD-10-CM

## 2024-11-14 PROCEDURE — 1159F MED LIST DOCD IN RCRD: CPT | Performed by: UROLOGY

## 2024-11-14 PROCEDURE — 3077F SYST BP >= 140 MM HG: CPT | Performed by: UROLOGY

## 2024-11-14 PROCEDURE — 3060F POS MICROALBUMINURIA REV: CPT | Performed by: UROLOGY

## 2024-11-14 PROCEDURE — 3079F DIAST BP 80-89 MM HG: CPT | Performed by: UROLOGY

## 2024-11-14 PROCEDURE — 99214 OFFICE O/P EST MOD 30 MIN: CPT | Performed by: UROLOGY

## 2024-11-14 PROCEDURE — 1036F TOBACCO NON-USER: CPT | Performed by: UROLOGY

## 2024-12-05 ENCOUNTER — LAB (OUTPATIENT)
Dept: LAB | Facility: LAB | Age: 69
End: 2024-12-05
Payer: MEDICARE

## 2024-12-05 DIAGNOSIS — N18.32 STAGE 3B CHRONIC KIDNEY DISEASE (MULTI): ICD-10-CM

## 2024-12-05 LAB
ANION GAP SERPL CALC-SCNC: 12 MMOL/L (ref 10–20)
APPEARANCE UR: CLEAR
BILIRUB UR STRIP.AUTO-MCNC: NEGATIVE MG/DL
BUN SERPL-MCNC: 26 MG/DL (ref 6–23)
CALCIUM SERPL-MCNC: 8.9 MG/DL (ref 8.6–10.3)
CHLORIDE SERPL-SCNC: 107 MMOL/L (ref 98–107)
CO2 SERPL-SCNC: 28 MMOL/L (ref 21–32)
COLOR UR: ABNORMAL
CREAT SERPL-MCNC: 2.11 MG/DL (ref 0.5–1.3)
EGFRCR SERPLBLD CKD-EPI 2021: 33 ML/MIN/1.73M*2
GLUCOSE SERPL-MCNC: 175 MG/DL (ref 74–99)
GLUCOSE UR STRIP.AUTO-MCNC: ABNORMAL MG/DL
KETONES UR STRIP.AUTO-MCNC: NEGATIVE MG/DL
LEUKOCYTE ESTERASE UR QL STRIP.AUTO: ABNORMAL
MUCOUS THREADS #/AREA URNS AUTO: ABNORMAL /LPF
NITRITE UR QL STRIP.AUTO: NEGATIVE
PH UR STRIP.AUTO: 5 [PH]
POTASSIUM SERPL-SCNC: 4 MMOL/L (ref 3.5–5.3)
PROT UR STRIP.AUTO-MCNC: ABNORMAL MG/DL
RBC # UR STRIP.AUTO: ABNORMAL /UL
RBC #/AREA URNS AUTO: ABNORMAL /HPF
SODIUM SERPL-SCNC: 143 MMOL/L (ref 136–145)
SP GR UR STRIP.AUTO: 1.02
UROBILINOGEN UR STRIP.AUTO-MCNC: NORMAL MG/DL
WBC #/AREA URNS AUTO: ABNORMAL /HPF

## 2024-12-05 PROCEDURE — 82570 ASSAY OF URINE CREATININE: CPT

## 2024-12-05 PROCEDURE — 80048 BASIC METABOLIC PNL TOTAL CA: CPT

## 2024-12-05 PROCEDURE — 36415 COLL VENOUS BLD VENIPUNCTURE: CPT

## 2024-12-05 PROCEDURE — 82043 UR ALBUMIN QUANTITATIVE: CPT

## 2024-12-05 PROCEDURE — 81001 URINALYSIS AUTO W/SCOPE: CPT

## 2024-12-06 LAB
CREAT UR-MCNC: <1 MG/DL (ref 20–370)
MICROALBUMIN UR-MCNC: <7 MG/L
MICROALBUMIN/CREAT UR: ABNORMAL MG/G{CREAT}

## 2024-12-11 ENCOUNTER — APPOINTMENT (OUTPATIENT)
Dept: NEPHROLOGY | Facility: CLINIC | Age: 69
End: 2024-12-11
Payer: MEDICARE

## 2024-12-12 ENCOUNTER — APPOINTMENT (OUTPATIENT)
Dept: UROLOGY | Facility: CLINIC | Age: 69
End: 2024-12-12
Payer: MEDICARE

## 2025-01-07 ENCOUNTER — APPOINTMENT (OUTPATIENT)
Dept: RADIOLOGY | Facility: CLINIC | Age: 70
End: 2025-01-07
Payer: MEDICARE

## 2025-01-08 ENCOUNTER — HOSPITAL ENCOUNTER (OUTPATIENT)
Dept: RADIOLOGY | Facility: CLINIC | Age: 70
Discharge: HOME | End: 2025-01-08
Payer: MEDICARE

## 2025-01-08 DIAGNOSIS — N28.9 RENAL DISEASE: ICD-10-CM

## 2025-01-08 PROCEDURE — 76770 US EXAM ABDO BACK WALL COMP: CPT

## 2025-01-08 PROCEDURE — 76770 US EXAM ABDO BACK WALL COMP: CPT | Performed by: STUDENT IN AN ORGANIZED HEALTH CARE EDUCATION/TRAINING PROGRAM

## 2025-01-13 ENCOUNTER — APPOINTMENT (OUTPATIENT)
Dept: NEPHROLOGY | Facility: CLINIC | Age: 70
End: 2025-01-13
Payer: MEDICARE

## 2025-01-13 VITALS
BODY MASS INDEX: 25.27 KG/M2 | WEIGHT: 170.6 LBS | SYSTOLIC BLOOD PRESSURE: 118 MMHG | DIASTOLIC BLOOD PRESSURE: 76 MMHG | HEIGHT: 69 IN | HEART RATE: 70 BPM

## 2025-01-13 DIAGNOSIS — I10 PRIMARY HYPERTENSION: ICD-10-CM

## 2025-01-13 DIAGNOSIS — E11.22 TYPE 2 DIABETES MELLITUS WITH STAGE 3B CHRONIC KIDNEY DISEASE, WITHOUT LONG-TERM CURRENT USE OF INSULIN (MULTI): ICD-10-CM

## 2025-01-13 DIAGNOSIS — N18.32 STAGE 3B CHRONIC KIDNEY DISEASE (MULTI): Primary | ICD-10-CM

## 2025-01-13 DIAGNOSIS — N18.32 TYPE 2 DIABETES MELLITUS WITH STAGE 3B CHRONIC KIDNEY DISEASE, WITHOUT LONG-TERM CURRENT USE OF INSULIN (MULTI): ICD-10-CM

## 2025-01-13 PROCEDURE — 3078F DIAST BP <80 MM HG: CPT | Performed by: CLINICAL NURSE SPECIALIST

## 2025-01-13 PROCEDURE — 1160F RVW MEDS BY RX/DR IN RCRD: CPT | Performed by: CLINICAL NURSE SPECIALIST

## 2025-01-13 PROCEDURE — 3008F BODY MASS INDEX DOCD: CPT | Performed by: CLINICAL NURSE SPECIALIST

## 2025-01-13 PROCEDURE — 1036F TOBACCO NON-USER: CPT | Performed by: CLINICAL NURSE SPECIALIST

## 2025-01-13 PROCEDURE — 99213 OFFICE O/P EST LOW 20 MIN: CPT | Performed by: CLINICAL NURSE SPECIALIST

## 2025-01-13 PROCEDURE — 1159F MED LIST DOCD IN RCRD: CPT | Performed by: CLINICAL NURSE SPECIALIST

## 2025-01-13 PROCEDURE — 3074F SYST BP LT 130 MM HG: CPT | Performed by: CLINICAL NURSE SPECIALIST

## 2025-01-13 RX ORDER — METOPROLOL SUCCINATE 100 MG/1
100 TABLET, EXTENDED RELEASE ORAL DAILY
Qty: 30 TABLET | Refills: 11 | Status: SHIPPED | OUTPATIENT
Start: 2025-01-13

## 2025-01-13 ASSESSMENT — ENCOUNTER SYMPTOMS
PSYCHIATRIC NEGATIVE: 1
CONSTITUTIONAL NEGATIVE: 1
NEUROLOGICAL NEGATIVE: 1
CARDIOVASCULAR NEGATIVE: 1
GASTROINTESTINAL NEGATIVE: 1
RESPIRATORY NEGATIVE: 1
MUSCULOSKELETAL NEGATIVE: 1
ENDOCRINE NEGATIVE: 1

## 2025-01-13 NOTE — ASSESSMENT & PLAN NOTE
Blood sugars have been uncontrolled, he does follow at [facility] for this, we will start Jardiance as he did bring in his paperwork today for us to order this for him

## 2025-01-13 NOTE — PROGRESS NOTES
Subjective   Patient ID: Frank Aquino is a 69 y.o. male who presents for Follow-up (6 month /Review labs ).  Patient being seen in follow-up for chronic kidney disease stage IV with history of diabetes and hypertension    Albumin creatinine ratio 83.1  Total cholesterol 156, LDL 85, HDL 50, triglycerides 107  Hemoglobin A1c 10.8  Glucose 327  Renal function the BUN of 25 and creatinine of 1.90, GFR is 45  Sodium 141, potassium 4.1, chloride 102, bicarb 29  H&H 14.8 and 45.8    He has been doing a significant amount of traveling  He has been staying with family members and his diet has not been what it normally is according to the patient, he did bring his paperwork today for Jardiance so hopefully will be able to start him on that  He is voiding without difficulties  He has no edema        Review of Systems   Constitutional: Negative.    Respiratory: Negative.     Cardiovascular: Negative.    Gastrointestinal: Negative.    Endocrine: Negative.    Genitourinary: Negative.    Musculoskeletal: Negative.    Skin: Negative.    Neurological: Negative.    Psychiatric/Behavioral: Negative.         Objective   Physical Exam  Vitals reviewed.   Constitutional:       Appearance: Normal appearance.   HENT:      Head: Normocephalic.   Cardiovascular:      Rate and Rhythm: Normal rate and regular rhythm.   Pulmonary:      Effort: Pulmonary effort is normal.      Breath sounds: Normal breath sounds.   Abdominal:      Palpations: Abdomen is soft.   Musculoskeletal:         General: Normal range of motion.   Skin:     General: Skin is warm and dry.   Neurological:      Mental Status: He is alert and oriented to person, place, and time.   Psychiatric:         Mood and Affect: Mood normal.         Behavior: Behavior normal.       Assessment/Plan   Problem List Items Addressed This Visit             ICD-10-CM    Chronic kidney disease (CKD), stage III (moderate) (Multi) - Primary N18.30     Renal function stay with creatinine of  1.90, blood pressure is well-controlled, diabetes not well-controlled, will attempt to start SGLT2         Relevant Medications    metoprolol succinate XL (Toprol-XL) 100 mg 24 hr tablet    Other Relevant Orders    Comprehensive metabolic panel    Follow Up In Nephrology    Hypertension I10     Blood pressure is currently well-controlled on metoprolol         Relevant Medications    metoprolol succinate XL (Toprol-XL) 100 mg 24 hr tablet    Type 2 diabetes mellitus with stage 3b chronic kidney disease (Multi) E11.22, N18.32     Blood sugars have been uncontrolled, he does follow at [facility] for this, we will start Jardiance as he did bring in his paperwork today for us to order this for him          CKD stage IIIb/IV with baseline creatinine of 2-2.5  Diabetes mellitus type 2  BPH with obstruction status post TURP  Elevated PSA/biopsy negative  Vitamin D deficiency  Hypertension/well controlled        Ruth Almanzar, DEISI-JHONATAN, DNP 01/13/25 2:17 PM

## 2025-01-13 NOTE — ASSESSMENT & PLAN NOTE
Renal function stay with creatinine of 1.90, blood pressure is well-controlled, diabetes not well-controlled, will attempt to start SGLT2

## 2025-01-20 DIAGNOSIS — N18.32 STAGE 3B CHRONIC KIDNEY DISEASE (MULTI): ICD-10-CM

## 2025-01-20 DIAGNOSIS — I10 PRIMARY HYPERTENSION: ICD-10-CM

## 2025-01-20 RX ORDER — METOPROLOL SUCCINATE 100 MG/1
100 TABLET, EXTENDED RELEASE ORAL DAILY
Qty: 30 TABLET | Refills: 11 | Status: SHIPPED | OUTPATIENT
Start: 2025-01-20

## 2025-01-20 NOTE — TELEPHONE ENCOUNTER
Pt called states he needs a refill on Metoprolol sent to [facility] on [address], [place] not to [facility].

## 2025-04-18 LAB
ALBUMIN SERPL-MCNC: 4 G/DL (ref 3.6–5.1)
ALP SERPL-CCNC: 98 U/L (ref 35–144)
ALT SERPL-CCNC: 19 U/L (ref 9–46)
ANION GAP SERPL CALCULATED.4IONS-SCNC: 7 MMOL/L (CALC) (ref 7–17)
AST SERPL-CCNC: 15 U/L (ref 10–35)
BILIRUB SERPL-MCNC: 0.4 MG/DL (ref 0.2–1.2)
BUN SERPL-MCNC: 22 MG/DL (ref 7–25)
CALCIUM SERPL-MCNC: 9.3 MG/DL (ref 8.6–10.3)
CHLORIDE SERPL-SCNC: 106 MMOL/L (ref 98–110)
CO2 SERPL-SCNC: 29 MMOL/L (ref 20–32)
CREAT SERPL-MCNC: 2.17 MG/DL (ref 0.7–1.28)
EGFRCR SERPLBLD CKD-EPI 2021: 32 ML/MIN/1.73M2
GLUCOSE SERPL-MCNC: 336 MG/DL (ref 65–99)
POTASSIUM SERPL-SCNC: 4 MMOL/L (ref 3.5–5.3)
PROT SERPL-MCNC: 7.3 G/DL (ref 6.1–8.1)
SODIUM SERPL-SCNC: 142 MMOL/L (ref 135–146)

## 2025-04-23 ENCOUNTER — APPOINTMENT (OUTPATIENT)
Dept: NEPHROLOGY | Facility: CLINIC | Age: 70
End: 2025-04-23
Payer: MEDICARE

## 2025-05-07 ENCOUNTER — APPOINTMENT (OUTPATIENT)
Dept: NEPHROLOGY | Facility: CLINIC | Age: 70
End: 2025-05-07
Payer: MEDICARE

## 2025-05-07 VITALS
HEIGHT: 69 IN | WEIGHT: 174.8 LBS | BODY MASS INDEX: 25.89 KG/M2 | SYSTOLIC BLOOD PRESSURE: 116 MMHG | HEART RATE: 71 BPM | DIASTOLIC BLOOD PRESSURE: 70 MMHG

## 2025-05-07 DIAGNOSIS — E11.22 TYPE 2 DIABETES MELLITUS WITH STAGE 3B CHRONIC KIDNEY DISEASE, WITHOUT LONG-TERM CURRENT USE OF INSULIN (MULTI): ICD-10-CM

## 2025-05-07 DIAGNOSIS — N18.32 TYPE 2 DIABETES MELLITUS WITH STAGE 3B CHRONIC KIDNEY DISEASE, WITHOUT LONG-TERM CURRENT USE OF INSULIN (MULTI): ICD-10-CM

## 2025-05-07 DIAGNOSIS — N18.32 STAGE 3B CHRONIC KIDNEY DISEASE (MULTI): Primary | ICD-10-CM

## 2025-05-07 DIAGNOSIS — I10 PRIMARY HYPERTENSION: ICD-10-CM

## 2025-05-07 PROCEDURE — 1160F RVW MEDS BY RX/DR IN RCRD: CPT | Performed by: CLINICAL NURSE SPECIALIST

## 2025-05-07 PROCEDURE — 1036F TOBACCO NON-USER: CPT | Performed by: CLINICAL NURSE SPECIALIST

## 2025-05-07 PROCEDURE — 3078F DIAST BP <80 MM HG: CPT | Performed by: CLINICAL NURSE SPECIALIST

## 2025-05-07 PROCEDURE — 99213 OFFICE O/P EST LOW 20 MIN: CPT | Performed by: CLINICAL NURSE SPECIALIST

## 2025-05-07 PROCEDURE — 3008F BODY MASS INDEX DOCD: CPT | Performed by: CLINICAL NURSE SPECIALIST

## 2025-05-07 PROCEDURE — 3074F SYST BP LT 130 MM HG: CPT | Performed by: CLINICAL NURSE SPECIALIST

## 2025-05-07 PROCEDURE — 1159F MED LIST DOCD IN RCRD: CPT | Performed by: CLINICAL NURSE SPECIALIST

## 2025-05-07 ASSESSMENT — ENCOUNTER SYMPTOMS
RESPIRATORY NEGATIVE: 1
GASTROINTESTINAL NEGATIVE: 1
EYES NEGATIVE: 1
MUSCULOSKELETAL NEGATIVE: 1
CARDIOVASCULAR NEGATIVE: 1
PSYCHIATRIC NEGATIVE: 1
HEMATOLOGIC/LYMPHATIC NEGATIVE: 1
NEUROLOGICAL NEGATIVE: 1
CONSTITUTIONAL NEGATIVE: 1
ENDOCRINE NEGATIVE: 1
ALLERGIC/IMMUNOLOGIC NEGATIVE: 1

## 2025-05-07 NOTE — PROGRESS NOTES
Subjective   Patient ID: Frank Aquino is a 69 y.o. male who presents for Follow-up (3 months/Review labs ).  Patient being seen in follow-up for chronic kidney disease stage IIIb/IV with history of diabetes mellitus type 2 and hypertension    Labs reviewed  Last hemoglobin A1c 12.9  Total cholesterol 160, triglycerides 227, HDL 45, LDL 70  Glucose 224  Renal function with a BUN of 17 and creatinine of 2.06, sodium 141, potassium 3.6, chloride 101, bicarb 27  Calcium 8.8  H&H 13.8 and 42.1    He is not voicing any complaints at this time  He denies any remembrance of being recently in the hospital at hospitals secondary to his low blood sugars  He is not checking his blood sugars at this time, continues to state that he has ordered the monitor but it has not come in yet  Blood pressure is well-controlled  Denies any lightheadedness or dizziness  Is voiding without difficulties  No edema        Review of Systems   Constitutional: Negative.    HENT: Negative.     Eyes: Negative.    Respiratory: Negative.     Cardiovascular: Negative.    Gastrointestinal: Negative.    Endocrine: Negative.    Genitourinary: Negative.    Musculoskeletal: Negative.    Skin: Negative.    Allergic/Immunologic: Negative.    Neurological: Negative.    Hematological: Negative.    Psychiatric/Behavioral: Negative.         Objective   Physical Exam  Constitutional:       Appearance: Normal appearance.   HENT:      Head: Normocephalic and atraumatic.      Mouth/Throat:      Mouth: Mucous membranes are moist.   Eyes:      Extraocular Movements: Extraocular movements intact.   Cardiovascular:      Rate and Rhythm: Normal rate and regular rhythm.      Heart sounds: Normal heart sounds.   Pulmonary:      Effort: Pulmonary effort is normal.      Breath sounds: Normal breath sounds.   Abdominal:      General: Bowel sounds are normal.      Palpations: Abdomen is soft.   Musculoskeletal:         General: Normal range of motion.   Skin:     General: Skin  is warm and dry.   Neurological:      Mental Status: He is alert.   Psychiatric:         Behavior: Behavior normal.         Thought Content: Thought content normal.         Assessment/Plan   Problem List Items Addressed This Visit           ICD-10-CM    Chronic kidney disease (CKD), stage III (moderate) (Multi) - Primary N18.30    Renal function is stable with creatinine of 2.06, blood pressure is well-controlled, diabetes uncontrolled, I did have another long discussion with him secondary to his uncontrolled diabetes, he states that he feels well and does not have any problems, I did explain to him that he is causing damage to his kidneys even though it is not showing at this time, we will attempt to restart Jardiance if he brings in his paperwork for this, will follow-up in 4 months         Relevant Orders    Basic metabolic panel    Urinalysis with Reflex Microscopic    Albumin-Creatinine Ratio, Urine Random    Follow Up In Nephrology    Hypertension I10    Blood pressure is well-controlled on isosorbide and metoprolol         Type 2 diabetes mellitus with stage 3b chronic kidney disease (Multi) E11.22, N18.32    Last hemoglobin A1c was 12.9, has follow-up with Adele Jacobo CNP at [facility] in June, not taking Jardiance as he has not brought in his financial records so we can get it for him, does not routinely check his blood sugars          CKD stage IIIb/IV with baseline creatinine of 2-2.5  Diabetes mellitus type 2  BPH with obstruction status post TURP  Elevated PSA/biopsy negative  Vitamin D deficiency  Hypertension/well controlled        DEISI Goyal-JHONATAN, DNP 05/07/25 9:44 AM

## 2025-05-07 NOTE — ASSESSMENT & PLAN NOTE
Renal function is stable with creatinine of 2.06, blood pressure is well-controlled, diabetes uncontrolled, I did have another long discussion with him secondary to his uncontrolled diabetes, he states that he feels well and does not have any problems, I did explain to him that he is causing damage to his kidneys even though it is not showing at this time, we will attempt to restart Jardiance if he brings in his paperwork for this, will follow-up in 4 months

## 2025-05-07 NOTE — ASSESSMENT & PLAN NOTE
Last hemoglobin A1c was 12.9, has follow-up with Adele Jacobo CNP at [facility] in June, not taking Jardiance as he has not brought in his financial records so we can get it for him, does not routinely check his blood sugars

## 2025-09-02 ENCOUNTER — APPOINTMENT (OUTPATIENT)
Dept: NEPHROLOGY | Facility: CLINIC | Age: 70
End: 2025-09-02
Payer: MEDICARE

## 2025-09-17 ENCOUNTER — APPOINTMENT (OUTPATIENT)
Dept: NEPHROLOGY | Facility: CLINIC | Age: 70
End: 2025-09-17
Payer: MEDICARE